# Patient Record
Sex: MALE | Race: WHITE | Employment: OTHER | URBAN - METROPOLITAN AREA
[De-identification: names, ages, dates, MRNs, and addresses within clinical notes are randomized per-mention and may not be internally consistent; named-entity substitution may affect disease eponyms.]

---

## 2017-02-16 NOTE — PATIENT DISCUSSION
Epiretinal Membrane Counseling: The diagnosis and natural history of epiretinal membrane was discussed with the patient including the risk of progression with retinal traction resulting in visual distortion. Patient instructed on how to do [address] to check for distortion in vision, The patient is instructed to call back immediately if any vision changes, and keep follow up as scheduled.

## 2017-12-11 NOTE — PATIENT DISCUSSION
DIABETES WITH RETINOPATHY: RETURN TO RETINAL SPECIALIST, DR. RAMIREZ. RETURN FOR FOLLOW-UP AS SCHEDULED.

## 2017-12-11 NOTE — PATIENT DISCUSSION
AMD (DRY), OU:  PRESCRIBE AREDS 2 VITAMINS / AMSLER GRID DAILY / UV PROTECTION. SMOKING AVOIDANCE ADVISED. RETURN FOR FOLLOW-UP AS SCHEDULED.

## 2017-12-11 NOTE — PATIENT DISCUSSION
Epiretinal Membrane Counseling: The diagnosis and natural history of epiretinal membrane was discussed with the patient including the risk of progression with retinal traction resulting in visual distortion. Patient instructed on how to do [address] to check for distortion in vision, The patient is instructed to call back immediately if any vision changes, and keep follow up as scheduled.

## 2018-07-02 NOTE — PATIENT DISCUSSION
NONPROLIFERATIVE DIABETIC RETINOPATHY OU: STABLE RETINOPATHY - RETURN FOR FOLLOW-UP AS SCHEDULED FOR DILATED EYE EXAM.

## 2018-12-13 NOTE — PATIENT DISCUSSION
DERMATOCHALASIS / PTOSIS RUL AND BEVERLY :  VISUALLY SIGNIFICANT TO PATIENT. SCHEDULE WITH OCULOPLASTIC SPECIALIST IF PATIENT DESIRES.

## 2018-12-13 NOTE — PATIENT DISCUSSION
Diabetes with Retinopathy Counseling:  I have discussed with the patient the importance of controlling blood glucose, blood pressure and lipid levels to minimize the risk of developing retinal disease from diabetes. Explained the importance of annual dilated eye exams because effective treatment for diabetic retinopathy depends on timely intervention. The patient was instructed to call or return sooner if they noticed blurred or distorted vision, fluctuating vision, pain or redness around one or both eyes. Return for follow-up as scheduled.

## 2019-12-16 NOTE — PATIENT DISCUSSION
NONPROLIFERATIVE DIABETIC RETINOPATHY, OU: POSITIVE EDEMA ON OCT OD TODAY. RETURN FOR FOLLOW-UP AS SCHEDULED FOR DILATED FUNDUS EXAM WITH DR. RAMIREZ AS SCHEDULED.

## 2019-12-16 NOTE — PATIENT DISCUSSION
*PCF OD: BECOMING VISUALLY SIGNIFICANT BUT NOT BOTHERSOME TO PATIENT AT THIS TIME. CONTINUE TO FOLLOW FOR NOW. OFFER SPEC RX UPDATE.

## 2019-12-16 NOTE — PATIENT DISCUSSION
12/16/2019OS-[id number]&nbsp;SN &nbsp; &nbsp; [facility] [place]

## 2020-12-17 NOTE — PATIENT DISCUSSION
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 RAMIREZ FOR EVAL/POSSIBLE TREATMENT OF WORSENING DMR.

## 2022-01-26 ENCOUNTER — CONSULTATION/EVALUATION (OUTPATIENT)
Dept: URBAN - METROPOLITAN AREA CLINIC 43 | Facility: CLINIC | Age: 77
End: 2022-01-26

## 2022-01-26 DIAGNOSIS — H35.372: ICD-10-CM

## 2022-01-26 DIAGNOSIS — Z96.1: ICD-10-CM

## 2022-01-26 DIAGNOSIS — E11.9: ICD-10-CM

## 2022-01-26 DIAGNOSIS — H02.833: ICD-10-CM

## 2022-01-26 DIAGNOSIS — H35.351: ICD-10-CM

## 2022-01-26 DIAGNOSIS — H26.493: ICD-10-CM

## 2022-01-26 DIAGNOSIS — H02.836: ICD-10-CM

## 2022-01-26 DIAGNOSIS — H52.7: ICD-10-CM

## 2022-01-26 PROCEDURE — 92134 CPTRZ OPH DX IMG PST SGM RTA: CPT

## 2022-01-26 PROCEDURE — 92004 COMPRE OPH EXAM NEW PT 1/>: CPT

## 2022-01-26 RX ORDER — DUREZOL 0.5 MG/ML: 1 EMULSION OPHTHALMIC

## 2022-01-26 RX ORDER — BROMFENAC 0.9 MG/ML: 1 SOLUTION/ DROPS OPHTHALMIC TWICE A DAY

## 2022-01-26 ASSESSMENT — VISUAL ACUITY
OD_SC: J6
OD_CC: 20/50
OS_CC: J6
OD_SC: 20/100
OS_BAT: >20/400
OS_CC: 20/70
OS_SC: 20/100
OD_CC: J4
OS_SC: J10

## 2022-01-26 ASSESSMENT — TONOMETRY
OS_IOP_MMHG: 11
OD_IOP_MMHG: 11

## 2022-01-26 NOTE — PATIENT DISCUSSION
Discussed the risks/benefits of laser capsulotomy. Recommend getting retina consult first and then re-address after retina issues resolved.

## 2022-01-26 NOTE — PATIENT DISCUSSION
Patient understands condition, prognosis and need for follow up care. Breath Sounds equal & clear to percussion & auscultation, no accessory muscle use

## 2022-01-28 ENCOUNTER — CONSULTATION/EVALUATION (OUTPATIENT)
Dept: URBAN - METROPOLITAN AREA CLINIC 39 | Facility: CLINIC | Age: 77
End: 2022-01-28

## 2022-01-28 VITALS — DIASTOLIC BLOOD PRESSURE: 88 MMHG | HEIGHT: 60 IN | SYSTOLIC BLOOD PRESSURE: 217 MMHG

## 2022-01-28 DIAGNOSIS — E11.3311: ICD-10-CM

## 2022-01-28 DIAGNOSIS — H35.372: ICD-10-CM

## 2022-01-28 DIAGNOSIS — H43.813: ICD-10-CM

## 2022-01-28 DIAGNOSIS — H35.031: ICD-10-CM

## 2022-01-28 PROCEDURE — 92134 CPTRZ OPH DX IMG PST SGM RTA: CPT

## 2022-01-28 PROCEDURE — 67028 INJECTION EYE DRUG: CPT

## 2022-01-28 PROCEDURE — 99214 OFFICE O/P EST MOD 30 MIN: CPT

## 2022-01-28 PROCEDURE — 92201 OPSCPY EXTND RTA DRAW UNI/BI: CPT

## 2022-01-28 ASSESSMENT — VISUAL ACUITY
OS_CC: J6
OD_CC: J3
OS_PH: 20/50-2
OD_CC: 20/40-1
OS_CC: 20/80+2

## 2022-01-28 ASSESSMENT — TONOMETRY
OS_IOP_MMHG: 16
OD_IOP_MMHG: 18

## 2022-01-28 NOTE — PATIENT DISCUSSION
Follow-up with primary care physician to ensure maximal control of blood pressure, cholesterol, and other cardiovascular risk factors.

## 2022-01-28 NOTE — PATIENT DISCUSSION
The patient has diabetes with mild BDR. There is no CSME or NV today. The patient was advised to maintain tight glucose control, tight blood pressure control, and favorable levels of cholesterol, low density lipoprotein, and high density lipoproteins. The importance of regular exams was stressed.

## 2022-01-28 NOTE — PATIENT DISCUSSION
Will re-address after retina consult/ +/- possible surgery/ treatment, and will re address after YAG. Discussed to patient that a lasik enhancement could be done if patient still unhappy with vision and this will be an OOP cost surgery.

## 2022-01-28 NOTE — PATIENT DISCUSSION
Recommended Eylea #1 (1 of 2) injection today. The injection was given and tolerated well by patient. Post-injection instructions were reviewed and understood by the patient. Series of 2 x 4 weeks apart.

## 2022-01-28 NOTE — PROCEDURE NOTE: CLINICAL
PROCEDURE NOTE: Eylea 2mg(1 of 2) #1 OD. Diagnosis: Moderate Nonproliferative Diabetic Retinopathy. Prep: Betadine Drops and Betadine Scrub. Prior to injection, risks/benefits/alternatives discussed including corneal abrasion, infection, loss of vision, hemorrhage, cataract, glaucoma, retinal tears or detachment. A written consent is on file, and the need for today’s injection was discussed and the patient is understanding and wishes to proceed. The entire vial of Eylea was drawn up into a syringe. The opened vial, remaining drug, and filtered needle were disposed of in a certified biohazard container. Betadine prep was performed. Topical anesthesia was induced with Alcaine. 4% lidocaine pledge. A lid speculum was used. A short 30g needle on a 1cc syringe was used with product that that had previously been prepared under sterile conditions. Injection site: 3-4 mm from the limbus. The used syringe/needle was transferred to a biohazard container. Lid speculum removed. Mask worn during procedure. Patient tolerated procedure well. Count fingers vision was verified. There were no complications. Patient was given the standard instruction sheet. Patient given office phone number/answering service number and advised to call immediately should there be loss of vision or pain, or should they have any other questions or concerns. Александр Padgett

## 2022-01-28 NOTE — PATIENT DISCUSSION
Discussed that surgery is possible in the future. Would like for DME to be under better control before proceeding with surgery.

## 2022-01-28 NOTE — PATIENT DISCUSSION
Discussed that patient should see PCP to see if BP meds need to be adjusted. For now, return to previous dose of carvedilol.

## 2022-02-24 ENCOUNTER — CLINIC PROCEDURE ONLY (OUTPATIENT)
Dept: URBAN - METROPOLITAN AREA CLINIC 43 | Facility: CLINIC | Age: 77
End: 2022-02-24

## 2022-02-24 VITALS — HEART RATE: 68 BPM | HEIGHT: 60 IN | DIASTOLIC BLOOD PRESSURE: 82 MMHG | SYSTOLIC BLOOD PRESSURE: 190 MMHG

## 2022-02-24 DIAGNOSIS — E11.3311: ICD-10-CM

## 2022-02-24 PROCEDURE — 67028 INJECTION EYE DRUG: CPT

## 2022-02-24 ASSESSMENT — TONOMETRY
OD_IOP_MMHG: 14
OS_IOP_MMHG: 14

## 2022-02-24 ASSESSMENT — VISUAL ACUITY
OS_CC: 20/40+2
OD_CC: 20/40-2

## 2022-02-24 NOTE — PATIENT DISCUSSION
I previously explained to the patient that there is swelling in their macula from their diabetic retinopathy.

## 2022-02-24 NOTE — PATIENT DISCUSSION
Patient here for [facility] Sample # 2  (2 of 2) injection today. (sample used due to scheduled before 28 days) The injection was given and tolerated well by patient. Post-injection instructions were reviewed and understood by the patient. Ends series of 2 x 4 weeks apart.  SAMPLE Eylea used today due to > 28 days from last inj.

## 2022-02-24 NOTE — PROCEDURE NOTE: CLINICAL
PROCEDURE NOTE: Eylea Sample (2 of 2) #2 OD. Diagnosis: Moderate Nonproliferative Diabetic Retinopathy. Anesthesia: Topical. Prep: Betadine Drops and Betadine Scrub. Prior to injection, risks/benefits/alternatives discussed including corneal abrasion, infection, loss of vision, hemorrhage, cataract, glaucoma, retinal tears or detachment. A written consent is on file, and the need for today’s injection was discussed and the patient is understanding and wishes to proceed. The entire vial of Eylea was drawn up into a syringe. The opened vial, remaining drug, and filtered needle were disposed of in a certified biohazard container. Betadine prep was performed. Topical anesthesia was induced with Alcaine. 4% lidocaine pledge. A lid speculum was used. A short 30g needle on a 1cc syringe was used with product that that had previously been prepared under sterile conditions. Injection site: 3-4 mm from the limbus. The used syringe/needle was transferred to a biohazard container. Lid speculum removed. Mask worn during procedure. Patient tolerated procedure well. Count fingers vision was verified. There were no complications. Patient was given the standard instruction sheet. Patient given office phone number/answering service number and advised to call immediately should there be loss of vision or pain, or should they have any other questions or concerns. Queen Alfredo

## 2022-02-24 NOTE — PATIENT DISCUSSION
Discussed that patient should see PCP to see if BP meds need adjusted. Patient did adjust and is now using carvedilol 6.5 mg BID.  BP still elevated today.

## 2022-03-24 ENCOUNTER — ESTABLISHED PATIENT (OUTPATIENT)
Dept: URBAN - METROPOLITAN AREA CLINIC 43 | Facility: CLINIC | Age: 77
End: 2022-03-24

## 2022-03-24 DIAGNOSIS — E11.3292: ICD-10-CM

## 2022-03-24 DIAGNOSIS — E11.3311: ICD-10-CM

## 2022-03-24 DIAGNOSIS — H43.813: ICD-10-CM

## 2022-03-24 DIAGNOSIS — H35.372: ICD-10-CM

## 2022-03-24 DIAGNOSIS — H35.031: ICD-10-CM

## 2022-03-24 PROCEDURE — 92202 OPSCPY EXTND ON/MAC DRAW: CPT

## 2022-03-24 PROCEDURE — 92134 CPTRZ OPH DX IMG PST SGM RTA: CPT

## 2022-03-24 PROCEDURE — 92014 COMPRE OPH EXAM EST PT 1/>: CPT

## 2022-03-24 PROCEDURE — 67028 INJECTION EYE DRUG: CPT

## 2022-03-24 ASSESSMENT — VISUAL ACUITY
OD_CC: 20/40-1
OS_PH: 20/40
OS_CC: 20/50+2

## 2022-03-24 ASSESSMENT — TONOMETRY
OS_IOP_MMHG: 18
OD_IOP_MMHG: 20

## 2022-03-24 NOTE — PATIENT DISCUSSION
Recommend Eylea # 3  (1 of 2) injection today. The injection was given and tolerated well by patient. Post-injection instructions were reviewed and understood by the patient.

## 2022-03-24 NOTE — PATIENT DISCUSSION
OCT and exam show persistent edema. Recommend continue treatment of Eylea. Series of 2 x 4 weeks apart.

## 2022-03-24 NOTE — PROCEDURE NOTE: CLINICAL
PROCEDURE NOTE: Eylea 2mg(1 of 2) #3 OD. Diagnosis: Moderate Nonproliferative Diabetic Retinopathy. Prep: Betadine Drops and Betadine Scrub. Prior to injection, risks/benefits/alternatives discussed including corneal abrasion, infection, loss of vision, hemorrhage, cataract, glaucoma, retinal tears or detachment. A written consent is on file, and the need for today’s injection was discussed and the patient is understanding and wishes to proceed. The entire vial of Eylea was drawn up into a syringe. The opened vial, remaining drug, and filtered needle were disposed of in a certified biohazard container. Betadine prep was performed. Topical anesthesia was induced with Alcaine. 4% lidocaine pledge. A lid speculum was used. A short 30g needle on a 1cc syringe was used with product that that had previously been prepared under sterile conditions. Injection site: 3-4 mm from the limbus. The used syringe/needle was transferred to a biohazard container. Lid speculum removed. Mask worn during procedure. Patient tolerated procedure well. Count fingers vision was verified. There were no complications. Patient was given the standard instruction sheet. Patient given office phone number/answering service number and advised to call immediately should there be loss of vision or pain, or should they have any other questions or concerns. Rafiq Rangel

## 2022-04-21 ENCOUNTER — CLINIC PROCEDURE ONLY (OUTPATIENT)
Dept: URBAN - METROPOLITAN AREA CLINIC 43 | Facility: CLINIC | Age: 77
End: 2022-04-21

## 2022-04-21 DIAGNOSIS — E11.3311: ICD-10-CM

## 2022-04-21 PROCEDURE — 67028 INJECTION EYE DRUG: CPT

## 2022-04-21 PROCEDURE — J0178PRE EYLEA PREFILLED SYRINGE

## 2022-04-21 ASSESSMENT — TONOMETRY
OD_IOP_MMHG: 12
OS_IOP_MMHG: 12

## 2022-04-21 ASSESSMENT — VISUAL ACUITY
OS_CC: 20/40-1
OD_CC: 20/50-1

## 2022-11-01 NOTE — PATIENT DISCUSSION
Diabetes with Retinopathy Counseling:  I have discussed with the patient the importance of controlling blood glucose, blood pressure and lipid levels to minimize the risk of developing retinal disease from diabetes. Explained the importance of annual dilated eye exams because effective treatment for diabetic retinopathy depends on timely intervention. The patient was instructed to call or return sooner if they noticed blurred or distorted vision, fluctuating vision, pain or redness around one or both eyes. Return for follow-up as scheduled. Drysol Pregnancy And Lactation Text: This medication is considered safe during pregnancy and breast feeding.

## 2024-08-19 NOTE — PATIENT DISCUSSION
Problem: At Risk for Injury Due to Fall  Goal: Patient does not fall  Outcome: Monitoring/Evaluating progress      S/P Blepharoplasty.